# Patient Record
Sex: MALE | Race: ASIAN | URBAN - METROPOLITAN AREA
[De-identification: names, ages, dates, MRNs, and addresses within clinical notes are randomized per-mention and may not be internally consistent; named-entity substitution may affect disease eponyms.]

---

## 2017-03-18 ENCOUNTER — OFFICE VISIT (OUTPATIENT)
Dept: URGENT CARE | Facility: URGENT CARE | Age: 62
End: 2017-03-18
Payer: COMMERCIAL

## 2017-03-18 VITALS — DIASTOLIC BLOOD PRESSURE: 90 MMHG | OXYGEN SATURATION: 99 % | SYSTOLIC BLOOD PRESSURE: 170 MMHG | HEART RATE: 97 BPM

## 2017-03-18 PROCEDURE — 99202 OFFICE O/P NEW SF 15 MIN: CPT | Performed by: FAMILY MEDICINE

## 2017-03-18 PROCEDURE — 93010 ELECTROCARDIOGRAM REPORT: CPT | Performed by: FAMILY MEDICINE

## 2017-03-18 RX ORDER — ATORVASTATIN CALCIUM 10 MG/1
10 TABLET, FILM COATED ORAL DAILY
COMMUNITY

## 2017-03-18 NOTE — MR AVS SNAPSHOT
"              After Visit Summary   3/18/2017    Mary Hedrick    MRN: [id number]           Patient Information     Date Of Birth          1955        Visit Information        Provider Department      3/18/2017 1:00 PM Emanuel Matos MD [facility]        Today's Diagnoses     HTN (hypertension)    -  1      Care Instructions    Continue the current dosing regimen of Amlodipine    Eat a low-sodium diet    Exercise    follow up with a primary care provider in 2 weeks.     Go to the emergency room if you develop chest pain, worsening shortness of breath, severe headaches, vision problems, problems moving the limbs, speech problems.          Follow-ups after your visit        Who to contact     If you have questions or need follow up information about today's clinic visit or your schedule please contact [facility] directly at [phone].  Normal or non-critical lab and imaging results will be communicated to you by [facility], letter or phone within 4 business days after the clinic has received the results. If you do not hear from us within 7 days, please contact the clinic through [facility] or phone. If you have a critical or abnormal lab result, we will notify you by phone as soon as possible.  Submit refill requests through [email or web address] or call your pharmacy and they will forward the refill request to us. Please allow 3 business days for your refill to be completed.          Additional Information About Your Visit        [facility] Information     [email or web address] lets you send messages to your doctor, view your test results, renew your prescriptions, schedule appointments and more. To sign up, go to [email or web address] . Click on \"Log in\" on the left side of the screen, which will take you to the Welcome page. Then click on \"Sign up Now\" on the right side of the page.     You will be asked to enter the access code listed below, as well as some personal information. Please follow the " directions to create your username and password.     Your access code is: [email or web address]  Expires: 2017  2:01 PM     Your access code will  in 90 days. If you need help or a new code, please call your [facility] clinic or [phone].        Care EveryWhere ID     This is your Care EveryWhere ID. This could be used by other organizations to access your [facility] medical records  [id number]        Your Vitals Were     Pulse Pulse Oximetry                97 99%           Blood Pressure from Last 3 Encounters:   17 170/90    Weight from Last 3 Encounters:   No data found for Wt              We Performed the Following     EKG 12-LEAD CLINIC READ        Primary Care Provider    Fvl None       No address on file        Thank you!     Thank you for choosing [facility]  for your care. Our goal is always to provide you with excellent care. Hearing back from our patients is one way we can continue to improve our services. Please take a few minutes to complete the written survey that you may receive in the mail after your visit with us. Thank you!             Your Updated Medication List - Protect others around you: Learn how to safely use, store and throw away your medicines at www.disposemymeds.org.          This list is accurate as of: 3/18/17  2:01 PM.  Always use your most recent med list.                   Brand Name Dispense Instructions for use    AMLODIPINE BESYLATE PO      Take 5 mg by mouth       atorvastatin 10 MG tablet    LIPITOR     Take 10 mg by mouth daily

## 2017-03-18 NOTE — PROGRESS NOTES
SUBJECTIVE:   Mary Hedrick is a 61 year old male recently diagnosed with hypertension for which patient has been taking Amlodipine since five days ago.  He is presenting with a chief complaint of elevated blood pressures which were measured at the [facility] (200/111, for example).  No shortness of breath/chest pain/headache/problems with vision/movement of the limbs. .      Past medical history:  Hypertension    Current Outpatient Prescriptions   Medication Sig Dispense Refill     AMLODIPINE BESYLATE PO Take 5 mg by mouth       atorvastatin (LIPITOR) 10 MG tablet Take 10 mg by mouth daily       Social History   Substance Use Topics     Smoking status: Never Smoker     Smokeless tobacco: Not on file     Alcohol use Not on file       ROS:  Review of systems negative except as stated above.    OBJECTIVE  :/90 (BP Location: Right arm, Patient Position: Chair, Cuff Size: Adult Regular)  Pulse 97  SpO2 99%  GENERAL APPEARANCE: healthy, alert and no distress  EYES: EOMI,  PERRL, conjunctiva clear  RESP: lungs clear to auscultation - no rales, rhonchi or wheezes  CV: regular rates and rhythm, normal S1 S2, no murmur noted  NEURO: Normal strength and tone, sensory exam grossly normal,  normal speech and mentation    EKG:  Normal sinus rate and rhythm.  Normal axes and intervals.  No arrhythmias.  No ST/T changes.      ASSESSMENT:  Hypertension    PLAN:  Continue with the current dose of Amlodipine  follow up with the primary care provider in 2 weeks.  Exercise  Eat a low-sodium diet.     Emanuel Matos MD

## 2017-03-18 NOTE — PATIENT INSTRUCTIONS
Continue the current dosing regimen of Amlodipine    Eat a low-sodium diet    Exercise    follow up with a primary care provider in 2 weeks.     Go to the emergency room if you develop chest pain, worsening shortness of breath, severe headaches, vision problems, problems moving the limbs, speech problems.

## 2017-03-18 NOTE — NURSING NOTE
Chief Complaint   Patient presents with     Urgent Care     Hypertension     pt was recently diagnosed with HTN, was seen at [facility] with pharmacist, BP was 200/111.  Pt was recently put on amlodipine.       Initial /90 (BP Location: Right arm, Patient Position: Chair, Cuff Size: Adult Regular)  Pulse 97  SpO2 99% There is no height or weight on file to calculate BMI.  Medication Reconciliation: [email or web address] Lazcano CMA                                3/18/2017 1:11 PM